# Patient Record
(demographics unavailable — no encounter records)

---

## 2024-10-10 NOTE — PLAN
[FreeTextEntry1] : #HCM -Breast self exam reviewed and taught -STI Screening declined -Pap/HPV today -Mammogram/breast US UTD, pt to send records -Immunizations: due for flu, declines covid  #menorrhagia and intermenstrual spotting -h/o em polyps -Plan for TVUS and subsequent embx. possible SHG pending pathology -ddx: em polyp vs. c/s niche vs fibroid -discussed contraception COCPs, IUD. pt will decide based on w/u -will rx TXA TID until w/u complete  #decreased libido -increased communication, foreplay with partner -offered ristela. pt will research first  RTO for TVUS and embx  MATEUS Hancock MD  RTO in 1 year for annual GYN exam or PRN for any GYN complaints SHYANN Hancock MD

## 2024-10-10 NOTE — HISTORY OF PRESENT ILLNESS
[Patient reported mammogram was normal] : Patient reported mammogram was normal [Patient reported breast sonogram was normal] : Patient reported breast sonogram was normal [Yes] : Patient has concerns regarding sex [Currently Active] : currently active [Men] : men [No] : No [Patient refuses STI testing] : Patient refuses STI testing [Mammogramdate] : 9/2024 [TextBox_19] : pt to send records [BreastSonogramDate] : 9/2024 [TextBox_25] : pt to send records [TextBox_31] : NILM, HRHPV neg [PapSmeardate] : 12/2022 [FreeTextEntry1] : decreased libido

## 2024-10-10 NOTE — PHYSICAL EXAM
[Appropriately responsive] : appropriately responsive [Alert] : alert [No Acute Distress] : no acute distress [Soft] : soft [Non-tender] : non-tender [Non-distended] : non-distended [No HSM] : No HSM [No Lesions] : no lesions [No Mass] : no mass [Oriented x3] : oriented x3 [Examination Of The Breasts] : a normal appearance [No Masses] : no breast masses were palpable [Labia Majora] : normal [Labia Minora] : normal [Scant] : There was scant vaginal bleeding [Normal] : normal [Uterine Adnexae] : normal [Declined] : Patient declined rectal exam

## 2024-11-18 NOTE — PHYSICAL EXAM
[Chaperone Present] : A chaperone was present in the examining room during all aspects of the physical examination [16053] : A chaperone was present during the pelvic exam. [Appropriately responsive] : appropriately responsive [Alert] : alert [No Acute Distress] : no acute distress [Oriented x3] : oriented x3 [Labia Majora] : normal [Labia Minora] : normal [Normal] : normal [Uterine Adnexae] : normal [FreeTextEntry2] :  Asha bradford)

## 2024-11-18 NOTE — PROCEDURE
[Endometrial Biopsy] : Endometrial biopsy [Time out performed] : Pre-procedure time out performed.  Patient's name, date of birth and procedure confirmed. [Consent Obtained] : Consent obtained [Irregular Bleeding] : irregular uterine bleeding [Risks] : risks [Benefits] : benefits [Alternatives] : alternatives [Patient] : patient [Infection] : infection [Bleeding] : bleeding [Allergic Reaction] : allergic reaction [Uterine Perforation] : uterine perforation [Pain] : pain [Negative] : negative pregnancy test [Betadine] : Betadine [None] : none [Tenaculum] : Tenaculum [Easy Passage] : Easy passage [Retroverted] : retroverted [Specimen Collected] : collected [Sent to Pathology] : placed in buffered formalin and sent for pathology [Tolerated Well] : Patient tolerated the procedure well [No Complications] : No complications [LMPDate] : 10/31/2024

## 2024-11-18 NOTE — PLAN
[FreeTextEntry1] :  #Endometrial Biopsy: -I will call pt with biopsy results in 7-10 days -Pt to schedule sonohysterogram on day 9-10 of period. -She is advised to call me if she experiences heavy vaginal bleeding, fever, chills or severe pain -Pt advised to use pain medication as needed, hot packs given.   RTO for sonohysterogram.

## 2024-11-18 NOTE — HISTORY OF PRESENT ILLNESS
[FreeTextEntry1] :  41 year old LEANN IRELAND pt presents for endometrial biopsy due to menorrhagia and intermenstrual spotting. LMP 10/31/2024.   11/18/2024: 4.96 mm secretory endometrium, R ovary with resolving CLC, L ovary normal   She feels well. Pt was prescribed TXA at her last visit, which alleviated the heavy bleeding, however, she reports that she got severe headache and nausea. She has stopped using it since then, and does not wish to continue using it. Pt also reports that she had cramping during her last endometrial biopsy.

## 2024-12-04 NOTE — HISTORY OF PRESENT ILLNESS
[FreeTextEntry1] :  41 year old LEANN IRELAND pt presents for sonohysterogram.   TVUS 11/18/2024: 4.96 mm secretory endometrium, R ovary with resolving CLC, L ovary normal  Past endometrial biopsy due to menorrhagia and intermenstrual spotting. All benign.   She feels well and offers no complaints. She reports monthly menses, not too heavy or painful. She denies intermenstrual bleeding, vaginal discharge or vaginitis symptoms. She reports normal urination, no dysuria or incontinence of urine. BM is normal per patient, no blood in stool or constipation/diarrhea. She denies abdominal and pelvic pain.

## 2024-12-04 NOTE — PROCEDURE
[Abnormal Uterine Bleeding] : abnormal uterine bleeding [Saline Infusion Sonography] : saline infusion sonography [FreeTextEntry3] : thin endometrium [FreeTextEntry4] : thin endometrium no murmur/no rub/regular rate and rhythm

## 2024-12-04 NOTE — PHYSICAL EXAM
[Chaperone Present] : A chaperone was present in the examining room during all aspects of the physical examination [58255] : A chaperone was present during the pelvic exam. [Labia Majora] : normal [Labia Minora] : normal [Normal] : normal [Uterine Adnexae] : normal [FreeTextEntry2] :  Asha bradford)

## 2024-12-04 NOTE — PLAN
[FreeTextEntry1] :  Sonohysterogram:  UCG negative Pt tolerated well- discussed risk of bleeding or infection Findings: thin endometrium Call MD if heavy bleeding, fever or chills.   #AUB and Menorrhagia:  -Results of sonohysterogram and TVUS discussed no structural causes for AUB -Discussed R/B/A of IUD vs OCPs -Pt declines at this time -To schedule Telemed if she changes her mind  RTO PRN.

## 2024-12-04 NOTE — PROCEDURE
[Abnormal Uterine Bleeding] : abnormal uterine bleeding [Saline Infusion Sonography] : saline infusion sonography [FreeTextEntry3] : thin endometrium [FreeTextEntry4] : thin endometrium

## 2024-12-04 NOTE — PHYSICAL EXAM
[Chaperone Present] : A chaperone was present in the examining room during all aspects of the physical examination [16260] : A chaperone was present during the pelvic exam. [Labia Majora] : normal [Labia Minora] : normal [Normal] : normal [Uterine Adnexae] : normal [FreeTextEntry2] :  Asha bradford)